# Patient Record
Sex: MALE | Race: WHITE | NOT HISPANIC OR LATINO | URBAN - METROPOLITAN AREA
[De-identification: names, ages, dates, MRNs, and addresses within clinical notes are randomized per-mention and may not be internally consistent; named-entity substitution may affect disease eponyms.]

---

## 2021-08-12 ENCOUNTER — EMERGENCY (EMERGENCY)
Facility: HOSPITAL | Age: 36
LOS: 1 days | Discharge: HOME | End: 2021-08-12
Attending: EMERGENCY MEDICINE | Admitting: EMERGENCY MEDICINE
Payer: COMMERCIAL

## 2021-08-12 VITALS
HEART RATE: 116 BPM | DIASTOLIC BLOOD PRESSURE: 103 MMHG | OXYGEN SATURATION: 100 % | RESPIRATION RATE: 16 BRPM | TEMPERATURE: 98 F | SYSTOLIC BLOOD PRESSURE: 135 MMHG

## 2021-08-12 LAB
ALBUMIN SERPL ELPH-MCNC: 4.5 G/DL — SIGNIFICANT CHANGE UP (ref 3.3–5)
ALP SERPL-CCNC: 75 U/L — SIGNIFICANT CHANGE UP (ref 40–120)
ALT FLD-CCNC: 64 U/L — HIGH (ref 4–41)
ANION GAP SERPL CALC-SCNC: 19 MMOL/L — HIGH (ref 7–14)
APAP SERPL-MCNC: <15 UG/ML — SIGNIFICANT CHANGE UP (ref 15–25)
AST SERPL-CCNC: 64 U/L — HIGH (ref 4–40)
BASOPHILS # BLD AUTO: 0.06 K/UL — SIGNIFICANT CHANGE UP (ref 0–0.2)
BASOPHILS NFR BLD AUTO: 0.7 % — SIGNIFICANT CHANGE UP (ref 0–2)
BILIRUB SERPL-MCNC: 0.3 MG/DL — SIGNIFICANT CHANGE UP (ref 0.2–1.2)
BUN SERPL-MCNC: 6 MG/DL — LOW (ref 7–23)
CALCIUM SERPL-MCNC: 9 MG/DL — SIGNIFICANT CHANGE UP (ref 8.4–10.5)
CHLORIDE SERPL-SCNC: 100 MMOL/L — SIGNIFICANT CHANGE UP (ref 98–107)
CO2 SERPL-SCNC: 22 MMOL/L — SIGNIFICANT CHANGE UP (ref 22–31)
CREAT SERPL-MCNC: 0.69 MG/DL — SIGNIFICANT CHANGE UP (ref 0.5–1.3)
EOSINOPHIL # BLD AUTO: 0.3 K/UL — SIGNIFICANT CHANGE UP (ref 0–0.5)
EOSINOPHIL NFR BLD AUTO: 3.7 % — SIGNIFICANT CHANGE UP (ref 0–6)
ETHANOL SERPL-MCNC: 339 MG/DL — HIGH
GLUCOSE SERPL-MCNC: 82 MG/DL — SIGNIFICANT CHANGE UP (ref 70–99)
HCT VFR BLD CALC: 43.8 % — SIGNIFICANT CHANGE UP (ref 39–50)
HGB BLD-MCNC: 14.3 G/DL — SIGNIFICANT CHANGE UP (ref 13–17)
IANC: 2.86 K/UL — SIGNIFICANT CHANGE UP (ref 1.5–8.5)
IMM GRANULOCYTES NFR BLD AUTO: 0.1 % — SIGNIFICANT CHANGE UP (ref 0–1.5)
LYMPHOCYTES # BLD AUTO: 4.03 K/UL — HIGH (ref 1–3.3)
LYMPHOCYTES # BLD AUTO: 50.2 % — HIGH (ref 13–44)
MCHC RBC-ENTMCNC: 27.9 PG — SIGNIFICANT CHANGE UP (ref 27–34)
MCHC RBC-ENTMCNC: 32.6 GM/DL — SIGNIFICANT CHANGE UP (ref 32–36)
MCV RBC AUTO: 85.4 FL — SIGNIFICANT CHANGE UP (ref 80–100)
MONOCYTES # BLD AUTO: 0.76 K/UL — SIGNIFICANT CHANGE UP (ref 0–0.9)
MONOCYTES NFR BLD AUTO: 9.5 % — SIGNIFICANT CHANGE UP (ref 2–14)
NEUTROPHILS # BLD AUTO: 2.86 K/UL — SIGNIFICANT CHANGE UP (ref 1.8–7.4)
NEUTROPHILS NFR BLD AUTO: 35.8 % — LOW (ref 43–77)
NRBC # BLD: 0 /100 WBCS — SIGNIFICANT CHANGE UP
NRBC # FLD: 0 K/UL — SIGNIFICANT CHANGE UP
PLATELET # BLD AUTO: 299 K/UL — SIGNIFICANT CHANGE UP (ref 150–400)
POTASSIUM SERPL-MCNC: 3.8 MMOL/L — SIGNIFICANT CHANGE UP (ref 3.5–5.3)
POTASSIUM SERPL-SCNC: 3.8 MMOL/L — SIGNIFICANT CHANGE UP (ref 3.5–5.3)
PROT SERPL-MCNC: 7.6 G/DL — SIGNIFICANT CHANGE UP (ref 6–8.3)
RBC # BLD: 5.13 M/UL — SIGNIFICANT CHANGE UP (ref 4.2–5.8)
RBC # FLD: 16.6 % — HIGH (ref 10.3–14.5)
SALICYLATES SERPL-MCNC: <5 MG/DL — LOW (ref 15–30)
SODIUM SERPL-SCNC: 141 MMOL/L — SIGNIFICANT CHANGE UP (ref 135–145)
WBC # BLD: 8.02 K/UL — SIGNIFICANT CHANGE UP (ref 3.8–10.5)
WBC # FLD AUTO: 8.02 K/UL — SIGNIFICANT CHANGE UP (ref 3.8–10.5)

## 2021-08-12 PROCEDURE — 73130 X-RAY EXAM OF HAND: CPT | Mod: 26,RT

## 2021-08-12 PROCEDURE — 73030 X-RAY EXAM OF SHOULDER: CPT | Mod: 26,LT,76

## 2021-08-12 PROCEDURE — 99291 CRITICAL CARE FIRST HOUR: CPT

## 2021-08-12 RX ORDER — SODIUM CHLORIDE 9 MG/ML
1000 INJECTION INTRAMUSCULAR; INTRAVENOUS; SUBCUTANEOUS ONCE
Refills: 0 | Status: COMPLETED | OUTPATIENT
Start: 2021-08-12 | End: 2021-08-12

## 2021-08-12 RX ORDER — HALOPERIDOL DECANOATE 100 MG/ML
2 INJECTION INTRAMUSCULAR ONCE
Refills: 0 | Status: COMPLETED | OUTPATIENT
Start: 2021-08-12 | End: 2021-08-12

## 2021-08-12 RX ADMIN — Medication 2 MILLIGRAM(S): at 20:40

## 2021-08-12 RX ADMIN — SODIUM CHLORIDE 1000 MILLILITER(S): 9 INJECTION INTRAMUSCULAR; INTRAVENOUS; SUBCUTANEOUS at 21:30

## 2021-08-12 RX ADMIN — HALOPERIDOL DECANOATE 2 MILLIGRAM(S): 100 INJECTION INTRAMUSCULAR at 21:30

## 2021-08-12 RX ADMIN — Medication 4 MILLIGRAM(S): at 21:30

## 2021-08-12 NOTE — ED ADULT NURSE REASSESSMENT NOTE - NS ED NURSE REASSESS COMMENT FT1
Patient experiencing respiratory depression, NPA placed by MD. Placed on 4L NC. Sating 100% and mentating well. Will continue to monitor.

## 2021-08-12 NOTE — ED PROVIDER NOTE - PROGRESS NOTE DETAILS
Pt stable, resting, in NAD.  previously, after he was medicated, pt started snoring loudly and became briefly hypoxic to low 80s. Was placed immediately on O2, and since he was found to be sleeping deeply (localizing pain, but snoring very heavily when undisturbed) we placed a NPA in left nare and briefly held jaw thrust.  Sat improved quickly with intervention (within 2-3 min as staff were alerted immediately by tele tech).  After that time pt was observed closely and followed on monitor c O2 sat and capnography - no further episodes of hypoxic, apnea, or obstructed AW. At present pt is breathing comfortably and sat 99% ORA for over 1 hr. Zhou ZAMORA: Pt signed out to me, pending imaging and reassessment.  Pt more arousable, making more purposeful movements and pulled out nasal trumpet.  VSS, no hypoxia or bradypnea, etco2 within normal range.  Will cont to monitor, awaiting CT/XR. Cristiano Nolasco D.O., PGY3 (Resident)  Patient signed out to me. Pending clinical sobriety  Patient clinically sober. AAOx3 to person, place, and time. Tolerating PO. Ambulating without assistance or difficulty. Patient with what appears to be sever anxiety/depression, passive SI, heavy alcohol use as a result. concern by family (has a child at home) for patient safety. Clear for BH eval and further psychiatric care. Dr. Sandra: Pt was signed out to me awaiting sobriety and re-eval with likely need for BH eval. Pt currently resting in [facility]. Dr. Sandra: Pt awake and oriented. Ambulating without any difficulty. Discussed CT and X-ray findings. Pt admits to he has been more depressed and would like to discuss further. Pt notes he has been drinking more secondary to depression.

## 2021-08-12 NOTE — ED PROVIDER NOTE - OBJECTIVE STATEMENT
36 y/o M PMH alcohol abuse (no reported hx withdrawal or admissions for detox) presents to ED from home via family for anxiety, depression and thoughts of hurting himself. Denies SI/HI. Denies hx SI or psychiatric admissions in the past. pt with bruising, ? old laceration to R eye. pt states he fell down flight of steps 3 days ago, denies LOC. 34 y/o M brought in urgently from ambulance bay d/t agitation, attempted elopement. States he has been drinking EtOH.  Has a h/o possible undiagnosed anxiety/depression, alcohol abuse (no reported hx withdrawal or admissions for detox) presents to ED from home via family for anxiety, depression and thoughts of hurting himself. Denies SI/HI. Denies hx SI or psychiatric admissions in the past. pt with bruising, ? old laceration to R eye. pt states he fell down flight of steps 3 days ago, denies LOC.  Appears intoxicated, emotionally labile, unsteady on his feet.  Refusing to state exactly how or why he came to ED today. States "I want help" "I am suffering, I was you to do what you can to help me" .  Agreeable to speaking with psych. unable to explain all injuries apparent on  his body. States he's been drinking very heavily for many years.  Tearful/remorseful. States he has a wife and children, is self employed ("I have it all but I am so anxious").  Has never seen a psychiatrist/therapist or attempted tx for anxiety.  Wandering out of room (Tr A) and had to be redirected numerous times.  Requested to speak with brother and had two telephone conversations with him.  Then attempted to run out of ED to "speak with my " returned with security.  Attempted again to elope, despite direct obvs by staff.  Eventually received medication for anxiolysis and to ensure safety, given that he appeared to lack insight into risks of his condition and of wandering in ED hallways, speaking with random patients, and walking into rooms/attempting to leave while shoeless and wearing underwear only. and appeared to lack decisional capacity at the time d/t alcohol intox.    Further collateral from wife (Sade - [phone]) - pt has been a heavy drinker x 10-11 yrs at least, becomes aggressive/agitated when intoxicated. Also, states he's been very overwhelmed/sad/anxious x 1 week.  Reports pt fell down stairs yesterday and directly into wall, which was how she states he sustained black eye. requesting  eval for anxiety/depression, possible suicidality.

## 2021-08-12 NOTE — ED ADULT NURSE NOTE - NSIMPLEMENTINTERV_GEN_ALL_ED
Implemented All Fall Risk Interventions:  [place] to call system. Call bell, personal items and telephone within reach. Instruct patient to call for assistance. Room bathroom lighting operational. Non-slip footwear when patient is off stretcher. Physically safe environment: no spills, clutter or unnecessary equipment. Stretcher in lowest position, wheels locked, appropriate side rails in place. Provide visual cue, wrist band, yellow gown, etc. Monitor gait and stability. Monitor for mental status changes and reorient to person, place, and time. Review medications for side effects contributing to fall risk. Reinforce activity limits and safety measures with patient and family.

## 2021-08-12 NOTE — ED PROVIDER NOTE - PATIENT PORTAL LINK FT
You can access the FollowMyHealth Patient Portal offered by [facility] by registering at the following website: [email or web address]. By joining [employer]’s FollowMyHealth portal, you will also be able to view your health information using other applications (apps) compatible with our system.

## 2021-08-12 NOTE — ED PROVIDER NOTE - MUSCULOSKELETAL, MLM
Spine appears normal, range of motion is not limited, no muscle or joint tenderness EXCEPT for swelling at RT 5th metacarpal, no deformity, also swelling at both wrists, random bruises throughout.

## 2021-08-12 NOTE — ED ADULT NURSE NOTE - OBJECTIVE STATEMENT
Pt +AOB admits to drinking a bottle of vodka a day, last drink a few hours ago. States "I want help" Pt noted to have bruising to right eye, swelling to right hand and small abrasion to right elbow. Pt also endorsing using cocaine 4 days ago. Pt denies si/hi. Belongings secured across from 21. 20g IV placed in right AC, labs sent.

## 2021-08-12 NOTE — ED PROVIDER NOTE - CLINICAL SUMMARY MEDICAL DECISION MAKING FREE TEXT BOX
36 y/o M PMH alcohol abuse (no reported hx withdrawal or admissions for detox) presents to ED from home via family for anxiety, depression and thoughts of hurting himself. concern for intoxciation depression anxiety fxs R hand, facial fxs given unreliable exam given pt's intoxication plan labs XRs CT max face reassess psyc consult 34 y/o M c alcohol abuse, possible undxd/unmanaged anxiety/depression, presents to ED from home via family for anxiety, depression and thoughts of hurting himself, and intox. Has numerous abrasions and small lacs, all old, no nactive bleeding. Also with swelling at right hand c/f fracture.  Right sided black eye and tiny lack over right eyelid.  Will CT and XR as ordered.  Send labs.  Obtain BH eval.  Monitor very closely given flight risk and agitation.

## 2021-08-12 NOTE — ED PROVIDER NOTE - ATTENDING CONTRIBUTION TO CARE
Attending Attestation: Dr. Leyva  I have personally performed a history and physical examination of the patient and discussed management with the resident as well as the patient.  I reviewed the resident's note and agree with the documented findings and plan of care.  I have authored and modified critical sections of the Provider Note, including but not limited to HPI, Physical Exam and MDM. 34 y/o M c alcohol abuse, possible undxd/unmanaged anxiety/depression, presents to ED from home via family for anxiety, depression and thoughts of hurting himself, and intox. Has numerous abrasions and small lacs, all old, no nactive bleeding. Also with swelling at right hand c/f fracture.  Right sided black eye and tiny lack over right eyelid.  Will CT and XR as ordered.  Send labs.  Obtain BH eval.  Monitor very closely given flight risk and agitation.     Upon my evaluation, this patient had a high probability of imminent or life-threatening deterioration due to concern for severe agitation, anxiety, elopement which required my direct attention, intervention, and personal management.  The patient has a  medical condition that impairs one or more vital organ systems.  Frequent personal assessment and adjustment of medical interventions was performed.       I have personally provided 60 minutes of critical care time exclusive of time spent on separately billable procedures. Time includes review of laboratory data, radiology results, discussion with consultants, patient and family; monitoring for potential decompensation, as well as time spent retrieving data and reviewing the chart and documenting the visit. Interventions were performed as documented above.

## 2021-08-12 NOTE — ED PROVIDER NOTE - PHYSICAL EXAMINATION
Gen: well developed male NAD appears intoxicated   HEENT: NCAT, EOMI, no nasal discharge, mucous membranes moist  CV: RRR, +S1/S2, no M/R/G  Resp: CTAB, no W/R/R  GI: Abdomen soft non-distended, NTTP, no masses  MSK: +old appearing 2cm laceration nongaping to R eyelid, bruising to lateral R hand   Neuro: A&Ox3, following commands, moving all four extremities spontaneously  Psych: agitated answering some questions refusing other questions, insisting on "speaking with  before bloodwork" Gen: well developed male appears intoxicated   HEENT: NCAT, EOMI, no nasal discharge, mucous membranes moist  CV: RRR, +S1/S2, no M/R/G  Resp: CTAB, no W/R/R  GI: Abdomen soft non-distended, NTTP, no masses  MSK: +old appearing 2cm laceration nongaping to R eyelid, bruising to lateral R hand   Neuro: A&Ox3, following commands, moving all four extremities spontaneously  Psych: agitated answering some questions refusing other questions, insisting on "speaking with  before bloodwork"

## 2021-08-12 NOTE — ED PROVIDER NOTE - SHIFT CHANGE DETAILS
f/u imaging incl XR and CT, reassess.  Obtain BH eval once sober for possible severe anxiety/depression/suicidality.

## 2021-08-12 NOTE — ED PROVIDER NOTE - ENMT, MLM
Airway patent, Nasal mucosa clear. Mouth with normal mucosa. Throat has no vesicles, no oropharyngeal exudates and uvula is midline. Except for multiple old abrasions on head. EOMI, pERRLA

## 2021-08-12 NOTE — ED PROVIDER NOTE - NSICDXPASTMEDICALHX_GEN_ALL_CORE_FT
PAST MEDICAL HISTORY:  No pertinent past medical history PAST MEDICAL HISTORY:  No pertinent past medical history alcohol abuse

## 2021-08-12 NOTE — ED PROVIDER NOTE - SKIN, MLM
Skin normal color for race, warm, dry and intact. No evidence of rash EXCEPt for several abrasions, old, non infected, right eye c periorbital ecchymosis.

## 2021-08-12 NOTE — ED ADULT TRIAGE NOTE - CHIEF COMPLAINT QUOTE
pt brought in by brother who states pt is intoxicated. pt endorsing drinking liter of whiskey daily, pt states "I am very anxious and depressed and I think about hurting myself all the time".  pt endorsing active SI, denies HI.  pt with bruising, laceration to R eye. pt states he fell down flight of steps 3 days ago, denies LOC. pt brother states pt punched wall of ED outside, bruising noted to R hand.

## 2021-08-13 VITALS
RESPIRATION RATE: 16 BRPM | TEMPERATURE: 98 F | OXYGEN SATURATION: 98 % | SYSTOLIC BLOOD PRESSURE: 132 MMHG | HEART RATE: 102 BPM | DIASTOLIC BLOOD PRESSURE: 83 MMHG

## 2021-08-13 DIAGNOSIS — F19.90 OTHER PSYCHOACTIVE SUBSTANCE USE, UNSPECIFIED, UNCOMPLICATED: ICD-10-CM

## 2021-08-13 DIAGNOSIS — F10.20 ALCOHOL DEPENDENCE, UNCOMPLICATED: ICD-10-CM

## 2021-08-13 DIAGNOSIS — F14.10 COCAINE ABUSE, UNCOMPLICATED: ICD-10-CM

## 2021-08-13 DIAGNOSIS — F19.94 OTHER PSYCHOACTIVE SUBSTANCE USE, UNSPECIFIED WITH PSYCHOACTIVE SUBSTANCE-INDUCED MOOD DISORDER: ICD-10-CM

## 2021-08-13 LAB
APPEARANCE UR: CLEAR — SIGNIFICANT CHANGE UP
BILIRUB UR-MCNC: NEGATIVE — SIGNIFICANT CHANGE UP
COLOR SPEC: COLORLESS — SIGNIFICANT CHANGE UP
DIFF PNL FLD: NEGATIVE — SIGNIFICANT CHANGE UP
GLUCOSE UR QL: NEGATIVE — SIGNIFICANT CHANGE UP
KETONES UR-MCNC: ABNORMAL
LEUKOCYTE ESTERASE UR-ACNC: NEGATIVE — SIGNIFICANT CHANGE UP
NITRITE UR-MCNC: NEGATIVE — SIGNIFICANT CHANGE UP
PCP SPEC-MCNC: SIGNIFICANT CHANGE UP
PH UR: 6.5 — SIGNIFICANT CHANGE UP (ref 5–8)
PROT UR-MCNC: NEGATIVE — SIGNIFICANT CHANGE UP
SARS-COV-2 RNA SPEC QL NAA+PROBE: SIGNIFICANT CHANGE UP
SP GR SPEC: 1.01 — SIGNIFICANT CHANGE UP (ref 1.01–1.02)
UROBILINOGEN FLD QL: SIGNIFICANT CHANGE UP

## 2021-08-13 PROCEDURE — 71045 X-RAY EXAM CHEST 1 VIEW: CPT | Mod: 26

## 2021-08-13 PROCEDURE — 73060 X-RAY EXAM OF HUMERUS: CPT | Mod: 26,50

## 2021-08-13 PROCEDURE — 72125 CT NECK SPINE W/O DYE: CPT | Mod: 26

## 2021-08-13 PROCEDURE — 90792 PSYCH DIAG EVAL W/MED SRVCS: CPT

## 2021-08-13 PROCEDURE — 70486 CT MAXILLOFACIAL W/O DYE: CPT | Mod: 26

## 2021-08-13 PROCEDURE — 73090 X-RAY EXAM OF FOREARM: CPT | Mod: 26,LT,76

## 2021-08-13 PROCEDURE — 73070 X-RAY EXAM OF ELBOW: CPT | Mod: 26,RT

## 2021-08-13 PROCEDURE — 70450 CT HEAD/BRAIN W/O DYE: CPT | Mod: 26

## 2021-08-13 NOTE — ED POST DISCHARGE NOTE - REASON FOR FOLLOW-UP
Other Upon discharge, pt stated his "gold chain" was missing. With investigation, the pt's belongings were found in a valuables envelope in  locker #11.

## 2021-08-13 NOTE — ED BEHAVIORAL HEALTH ASSESSMENT NOTE - OTHER
advised pt/family to return to the ED if symptoms worsen or thoughts of SI/HI. Also educated pt on symptoms of alcohol withdrawal and went to seek medical attention.

## 2021-08-13 NOTE — SBIRT NOTE ADULT - NSSBIRTALCPASSREFTXDET_GEN_A_CORE
Provided SBIRT services: Full screen positive. Referral to Treatment Performed. Screening results were reviewed with the patient and patient was provided information about healthy guidelines and potential negative consequences associated with level of risk. Motivation and readiness to reduce or stop use was discussed and goals and activities to make changes were suggested/offered. Referral for complete assessment and level of care determination at a certified treatment facility was completed by giving the patient information for treatment facilities that met their needs and encouraging them to call for an appointment. Provided pt with contact info to dual diagnosis outpatient programs located in [place], [place]: [facility] (p: [phone]), [facility] (p: [phone]), [facility] (p: [phone]), and [facility] (p: [phone]). Pt was also provided with teleSNanostimT phone line for external navigation and referrals.

## 2021-08-13 NOTE — ED BEHAVIORAL HEALTH ASSESSMENT NOTE - CASE SUMMARY
35M with alcohol use disorder, brought in by family for alcohol binge, referred to psychiatry for evaluation due to concern for depression and anxiety. Patient arrived to the ED highly intoxicated, required PRN, and was permitted several hours to sleep and metabolize alcohol. Consult requested due to family concerns for depression. Patient endorses anxiety, disrupted sleep, low appetite, but denies suicidal ideation and homicidal ideation. No mihaela or psychosis elicited. Patient refuses admission, would most benefit from outpatient treatment to address both substance/psych. Educated pt regarding alcohol withdrawal sx and when to return to the ED.

## 2021-08-13 NOTE — ED ADULT NURSE REASSESSMENT NOTE - NS ED NURSE REASSESS COMMENT FT1
Pt received from RASHMI Tariq. Pt currently resting in bed, calm & cooperative. Pt assisted with restroom, urine samples collected. 1:1 at bedside as ordered. Plan for possible clearance to behavioral health. Will continue to monitor.

## 2021-08-13 NOTE — ED BEHAVIORAL HEALTH ASSESSMENT NOTE - VIOLENCE PROTECTIVE FACTORS:
Residential stability/Relationship stability/Employment stability/Insight into violence risk and need for management/treatment

## 2021-08-13 NOTE — ED BEHAVIORAL HEALTH ASSESSMENT NOTE - NSSUICPROTFACT_PSY_ALL_CORE
Responsibility to children, family, or others/Identifies reasons for living/Supportive social network of family or friends/Fear of death or the actual act of killing self/Cultural, spiritual and/or moral attitudes against suicide/Engaged in work or school/Frustration tolerance

## 2021-08-13 NOTE — ED BEHAVIORAL HEALTH ASSESSMENT NOTE - SUMMARY
The patient is a 35 year old man, lives with his wife and 2 children ages 6 and 9, owner of a [employer], no past psychiatric history, no inpatient hospitalizations, not in treatment, no past SA or NSSIB, history of alcohol and cocaine abuse, no history of alcohol withdrawal or seizures, 1 episode of inpatient rehab for alcohol abuse, no history of aggression or violence, no known legal problems,  brought in by family for alcohol binge referred to psychiatry for evaluation due to concern for depression.    The patient presents with depressed and anxious mood in the context of life stressors and maladaptive coping through recent alcohol and cocaine use. On exam the patient is dysphoric, likely as a consequence of alcohol and cocaine use. He denies current suicidal ideation, has no history of suicide attempts and cites robust protective factors against self harm.  No signs of acute withdrawal were present. The patient refuses referral to detox or rehab services and states he wants referral for outpatient medication treatment. The patient will return home to stay with his family who will monitor his alcohol use and help him connect with referrals for treatment. The patient's wife has no immediate safety concerns and is in agreement with the plan.    In light of the above and despite his recent crisis, the patient is not an imminent risk of harm to self or others and does not meet criteria for involuntary hospitalization for psychiatric treatment.

## 2021-08-13 NOTE — ED BEHAVIORAL HEALTH NOTE - BEHAVIORAL HEALTH NOTE
Worker called patient’s sister Willa Cazares ([phone]) for collateral information and left a message for call back. Worker then called patient’s wife Sade ([phone]) and left a message for call back.     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 Sade states that the patient has seen therapists in the past but has not been consistent with any treatment. She states that the patient went to rehab in [address] [place] [address] for 30 days and then left early and released 8 months later. Wife reports that the patient has a pattern with alcohol where he would go days being sober and then like a week or two after would binge drink. She states that in the past these episodes of drinking were more spaced out but now it has been ongoing for the past 5- 7 days. She states that the patient drinks alcohol to pass out and to deal with his severe anxiety and self-medicates with alcohol. She states that the patient has been intoxicated for most of the past 7 days. She states that the patient does not drink alcohol in front of her or the children but would go into another room and drink almost 2 bottles of alcohol and has trouble stopping. She states that he would do this when she works at home in her home office. She states that the patient gets loud and yells when he drinks alcohol and has “asshole” traits. She states that the patient presents as if he has bipolar disorder because one minute he cries and the next he is slamming doors. She states that she tries to keep the children from seeing the patient when he is under the influence. The children are mostly sleeping when the patient is presenting intoxicated. Pt’s spouse states that she is with the children now. Sade states that 3 days ago the patient fell down the stairs into the wall and made a huge hole in the wall. She states that the patient has a black and blue from this. Wife states that last night the patient kept saying that he is a “fuck up” and he hates himself. Wife believes he is hurt inside and depressed. Patient did not make any si/hi statements and has no hx of SI/SA. Patient has no withdrawal episodes. Sade states that she is selling their house and told him on Sunday that she wants a divorce and is leaving him. Sade states that the patient is not changing his ways and she really does not want to leave the patient but does not feel like he will change. She states that the patient has not been sleeping or eating well because of the drinking alcohol. She states that he does not use any other drugs. She denies that the patient has legal issues or guns or weapons. She states that the patient is not physically aggressive and has not traveled or received the covid-19 vaccine. She states that the patient is not on any medication currently but was taking something for his ulcers.  She states that she does not see the patient trying to fix himself. She states that the patient is not having AH/VH. She states that the patient is talking in his sleep. She states that the patient is concerned about financial loss and has anxiety around this. No triggers noted. Case discussed with psychiatry. Worker called patient’s sister Willa Cazares ([phone]) for collateral information and left a message for call back. 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 Sade states that the patient has seen therapists in the past but has not been consistent with any treatment. She states that the patient went to rehab in [address] [place] [address] for 30 days and then left early and released 8 months later. Wife reports that the patient has a pattern with alcohol where he would go days being sober and then like a week or two after would binge drink. She states that in the past these episodes of drinking were more spaced out but now it has been ongoing for the past 5- 7 days. She states that the patient drinks alcohol to pass out and to deal with his severe anxiety and self-medicates with alcohol. She states that the patient has been intoxicated for most of the past 7 days. She states that the patient does not drink alcohol in front of her or the children but would go into another room and drink almost 2 bottles of alcohol and has trouble stopping. She states that he would do this when she works at home in her home office. She states that the patient gets loud and yells when he drinks alcohol and has “asshole” traits. She states that the patient presents as if he has bipolar disorder because one minute he cries and the next he is slamming doors. She states that she tries to keep the children from seeing the patient when he is under the influence. The children are mostly sleeping when the patient is presenting intoxicated. Pt’s spouse states that she is with the children now. Sade states that 3 days ago the patient fell down the stairs into the wall and made a huge hole in the wall. She states that the patient has a black and blue from this. Wife states that last night the patient kept saying that he is a “fuck up” and he hates himself. Wife believes he is hurt inside and depressed. Patient did not make any si/hi statements and has no hx of SI/SA. Patient has no withdrawal episodes. Sade states that she is selling their house and told him on Sunday that she wants a divorce and is leaving him. Sade states that the patient is not changing his ways and she really does not want to leave the patient but does not feel like he will change. She states that the patient has not been sleeping or eating well because of the drinking alcohol. She states that he does not use any other drugs. She denies that the patient has legal issues or guns or weapons. She states that the patient is not physically aggressive and has not traveled or received the covid-19 vaccine. She states that the patient is not on any medication currently but was taking something for his ulcers.  She states that she does not see the patient trying to fix himself. She states that the patient is not having AH/VH. She states that the patient is talking in his sleep. She states that the patient is concerned about financial loss and has anxiety around this. No triggers noted. Case discussed with psychiatry.

## 2021-08-13 NOTE — ED ADULT NURSE REASSESSMENT NOTE - NS ED NURSE REASSESS COMMENT FT1
Pt cleared medically as per E.D. providers. Pt denies SI/HI, denies visual/auditory hallucinations. Calm, cooperative at this time. Report given to behavioral health RN Amberly. IV line pulled. Pt assisted to behavioral health by ED tech. 1:1 order discontinued.

## 2021-08-13 NOTE — ED BEHAVIORAL HEALTH ASSESSMENT NOTE - RISK ASSESSMENT
The patient has acute risk factors for suicide including substance use, current mood episode, interpersonal relationship dysfunction. The patient has chronic risk factors including male sex and young age. The patient has protective factors including, no current SI, no SA, no global insomnia, no CAH, future oriented, cites responsibility to family and reasons to live, engaged with work, help seeking. Given the above the patient possesses minimally elevated acute risk of suicide above a baseline low chronic risk and does not meet criteria for inpatient hospitalization. Risk reduction can be accomplished through addressing acute risk factors on an ongoing outpatient basis. Low Acute Suicide Risk

## 2021-08-13 NOTE — ED BEHAVIORAL HEALTH ASSESSMENT NOTE - HPI (INCLUDE ILLNESS QUALITY, SEVERITY, DURATION, TIMING, CONTEXT, MODIFYING FACTORS, ASSOCIATED SIGNS AND SYMPTOMS)
The patient is a 35 year old man, lives with his wife and 2 children ages 6 and 9, owner of a [employer], no past psychiatric history, no inpatient hospitalizations, not in treatment, no past SA or NSSIB, history of alcohol and cocaine abuse, no history of alcohol withdrawal or seizures, 1 episode of inpatient rehab for alcohol abuse, no history of aggression or violence, no known legal problems,  brought in by family for alcohol binge referred to psychiatry for evaluation due to concern for depression and anxiety.    The patient reports that he has been binging alcohol for the last week and has a long history of drinking. He drinks about 1 L of whiskey per day (last use was about 24 hours ago), and uses cocaine about 4 grams/week (last use was 4 days ago). He denies any history of withdrawal effects from alcohol or cocaine. He states that he drinks "to treat my anxiety". He cannot identify any new triggers but states that he has chronic stress due to finances, his kids and relationship stress. He often feels overwhelmed by anxiety and stress and when this occurs he kiara by drinking. He reports that his mood is "all over the place". He has been sleeping late into the day after his nights of drinking. He has not been eating well, has had trouble focusing and has not been able to go to work due being exhausted from drinking. He sometimes wonders if it would be better if he didn't wake up in the morning, but has no intention or desire to kill himself. He has no current suicidal ideation. He denies any homicidal ideation. He cites his family and his children as a protective factor and states that "I love myself too much and love my kids too much to ever kill myself". He denies auditory or visual hallucinations. No delusional content was elicited. The patient reports that he does not want to go to rehab whether inpatient or outpatient and does not want to go to inpatient psychiatry. He states he would like "medication to stop drinking" so "I can be better for my family".    The patient's wife Sade was reached for collateral. For further see ED behavioral health note.

## 2021-08-13 NOTE — ED BEHAVIORAL HEALTH ASSESSMENT NOTE - DESCRIPTION
The patient arrived acutely intoxicated and required Haldol and Ativan for agitation. He was evaluated for facial injuries from a fall 3 day ago. The patient was cleared medically. He was seen for psychiatric evaluation and by SBIRT team and provided referral for dual diagnosis programs after refusing inpatient services.    Vital Signs Last 24 Hrs  T(C): 36.6 (13 Aug 2021 12:22), Max: 36.9 (12 Aug 2021 19:34)  T(F): 97.9 (13 Aug 2021 12:22), Max: 98.5 (12 Aug 2021 19:34)  HR: 102 (13 Aug 2021 12:22) (95 - 116)  BP: 132/83 (13 Aug 2021 12:22) (104/71 - 135/103)  BP(mean): --  RR: 16 (13 Aug 2021 12:22) (13 - 27)  SpO2: 98% (13 Aug 2021 12:22) (96% - 100%) None lives with wife and children, owns a [employer]

## 2021-08-13 NOTE — ED BEHAVIORAL HEALTH NOTE - BEHAVIORAL HEALTH NOTE
Worker called [facility] [phone] and spoke to Sheri MARSHALL who states that the patient's wife Sade is acting accordingly regarding the children.  She states that the children are not left alone with the pt when he is unstable and the children are not at risk. Patient is being followed up by SBIRT. Patient's wife will pick patient up in the next hour. Worker called [facility] [phone] and spoke to Sheri MARSHALL who states that the patient's wife Sade is acting accordingly regarding the children.  She states that the children are not left alone with the pt when he is unstable and the children are not at risk. No report taken. She states she will do additional information if there is an open report.  Patient is being followed up by SBIRT. Patient's wife will pick patient up in the next hour.

## 2021-08-13 NOTE — ED BEHAVIORAL HEALTH ASSESSMENT NOTE - DETAILS
Son and Daughter 6 and 9 Spoke with patient's wife filed in chart and copy given to the patient. Father with alcohol abuse occasional passive thoughts of "not waking up", easily controlled, no intention or plan to die

## 2021-08-14 NOTE — ED BEHAVIORAL HEALTH NOTE - BEHAVIORAL HEALTH NOTE
SW called and spoke with pt (P#[phone]). Pt reported he was feeling well and was starting an outpatient program on Monday.  Pt stated he is going to the [facility], located at [address] [place] [address].  Pt denied further SW interventions or needs at this time.

## 2022-06-17 NOTE — ED BEHAVIORAL HEALTH ASSESSMENT NOTE - PAST PSYCHOTROPIC MEDICATION
Admitting MD paged    Pt temp 101.3 oral, scheduled Tylenol given and NS infusing at 75 ml/hr overnight, no post-op IV abx ordered. Any new orders? Thank you  
None

## 2023-02-28 NOTE — ED PROVIDER NOTE - WR ORDER DATE AND TIME 4
Patient transported to [address] [place] [address],[facility], RN  02/28/23 2400 13-Aug-2021 01:06

## 2023-03-14 NOTE — ED PROVIDER NOTE - WR ORDER STATUS 5
Orientation to room/Bed in low position, brakes on/Side rails x 2 or 4 up, assess large gaps, such that a patient could get extremity or other body part entrapped, use additional safety procedures/Use of non-skid footwear for ambulating patients, use of appropriate size clothing to prevent risk of tripping/Assess eliminations need, assist as needed/Environment clear of unused equipment, furniture's in place, clear of hazards/Assess for adequate lighting, leave nightlight on/Patient and family education available to parents and patient/Document fall prevention teaching and include in plan of care Resulted
